# Patient Record
Sex: FEMALE | Race: WHITE | NOT HISPANIC OR LATINO | Employment: FULL TIME | URBAN - METROPOLITAN AREA
[De-identification: names, ages, dates, MRNs, and addresses within clinical notes are randomized per-mention and may not be internally consistent; named-entity substitution may affect disease eponyms.]

---

## 2024-02-12 ENCOUNTER — APPOINTMENT (OUTPATIENT)
Dept: PRIMARY CARE | Facility: CLINIC | Age: 49
End: 2024-02-12
Payer: COMMERCIAL

## 2024-02-14 ENCOUNTER — APPOINTMENT (OUTPATIENT)
Dept: PRIMARY CARE | Facility: CLINIC | Age: 49
End: 2024-02-14
Payer: COMMERCIAL

## 2024-02-20 ENCOUNTER — OFFICE VISIT (OUTPATIENT)
Dept: PRIMARY CARE | Facility: CLINIC | Age: 49
End: 2024-02-20
Payer: COMMERCIAL

## 2024-02-20 VITALS
SYSTOLIC BLOOD PRESSURE: 112 MMHG | HEART RATE: 91 BPM | BODY MASS INDEX: 24.11 KG/M2 | OXYGEN SATURATION: 99 % | DIASTOLIC BLOOD PRESSURE: 78 MMHG | TEMPERATURE: 97.4 F | WEIGHT: 150 LBS | HEIGHT: 66 IN

## 2024-02-20 DIAGNOSIS — Z12.31 VISIT FOR SCREENING MAMMOGRAM: ICD-10-CM

## 2024-02-20 DIAGNOSIS — Z00.00 ROUTINE GENERAL MEDICAL EXAMINATION AT A HEALTH CARE FACILITY: Primary | ICD-10-CM

## 2024-02-20 PROCEDURE — 1036F TOBACCO NON-USER: CPT | Performed by: INTERNAL MEDICINE

## 2024-02-20 PROCEDURE — 99396 PREV VISIT EST AGE 40-64: CPT | Performed by: INTERNAL MEDICINE

## 2024-02-20 RX ORDER — BISMUTH SUBSALICYLATE 262 MG
1 TABLET,CHEWABLE ORAL DAILY
COMMUNITY

## 2024-02-20 RX ORDER — NAPROXEN SODIUM 220 MG/1
81 TABLET, FILM COATED ORAL 2 TIMES DAILY
COMMUNITY

## 2024-02-20 ASSESSMENT — PATIENT HEALTH QUESTIONNAIRE - PHQ9
2. FEELING DOWN, DEPRESSED OR HOPELESS: NOT AT ALL
1. LITTLE INTEREST OR PLEASURE IN DOING THINGS: NOT AT ALL
SUM OF ALL RESPONSES TO PHQ9 QUESTIONS 1 AND 2: 0

## 2024-02-20 ASSESSMENT — ENCOUNTER SYMPTOMS
OCCASIONAL FEELINGS OF UNSTEADINESS: 0
LOSS OF SENSATION IN FEET: 0
DEPRESSION: 0

## 2024-02-20 NOTE — PROGRESS NOTES
Subjective   Patient ID: Angelique German is a 48 y.o. female who presents for Annual Exam (EP.  Annual exam.  Would like iron redrawn, she is having fatigue.).  HPI  Angelique German is here for annual check-up.      Concerns  1 yr     Reported Health good    Dental visits reg  Vision checks reg     Diet healthy iron rich foods   Exercise   less in winter  Caffeine  daily  Tobacco never  Alcohol  social    Female : Menstrual status /pregnancy status  reg and heavy     Colorectal cancer screening  2019    Current issues slight cold last week and lingering  cough   Had physical for work in the fall and had low iron had egd and colonoscopy in 2019    Was on pantoprazole and tries diet   Had shingles in Dec and was treated   by       Review of Systems  GENERAL - Denies fever,  or chills  is tired a lot   SKIN - Denies rash, new skin lesions, or change in moles  EYES - Denies blurred vision, or change in visual acuity  EARS - Denies ear pain, discharge, ringing, or difficulty hearing  NOSE - Denies nasal congestion, discharge, or bleeding  MOUTH - Denies sore throat, postnasal drip or painful/difficulty swallowing  NECK - Denies pain or swelling  RESPIRATORY - Denies shortness of breath, cough, wheezing  CARDIOVASCULAR - Denies palpitations, chest pain, orthopnea, peripheral edema, syncope or claudication  GASTROINTESTINAL - Denies nausea, vomiting, diarrhea, constipation, abdominal pain, melena and or bright red blood  GENITOURINARY - Denies dysuria, frequency of urination, urgency, or hesitancy  MUSCULOSKELETAL - Denies joint or muscle pain, or back pain  NEUROLOGICAL - Denies localized numbness, weakness, or tingling  PSYCHIATRIC - Denies depression, anxiety, substance abuse, suicidal or homicidal ideation  ENDOCRINE - Denies heat or cold intolerance, weight loss or gain, increasing thirst  HEMATO-IMMUNOLOGIC - Denies easy bruising, bleeding, oral ulcerations or recurrent infections      Objective   /78    "Pulse 91   Temp 36.3 °C (97.4 °F) (Oral)   Ht 1.676 m (5' 6\")   Wt 68 kg (150 lb)   LMP 02/06/2024   SpO2 99%   BMI 24.21 kg/m²      Physical Exam  CONSTITUTIONAL - well nourished, well developed, looks like stated age, in no acute distress, not ill-appearing, and not tired appearing  SKIN - normal skin color and pigmentation, normal skin turgor without rash, lesions, or nodules visualized  HEAD - no trauma, normocephalic  EYES  -  nl b  eomi and fundi normal  ENT - TM's intact, no injection, no exudate,  nasal passage without discharge and patent  NECK - supple without rigidity, no neck mass was observed, no thyromegaly or thyroid nodules  CHEST - clear to auscultation, no wheezing, no crackles and no rales, good effort  CARDIAC - regular rate and regular rhythm, no skipped beats, no murmur  ABDOMEN - no organomegaly, soft, nontender, nondistended, normal bowel sounds, no guarding/rebound/rigidity  EXTREMITIES - no edema, no deformities  NEUROLOGICAL -  nl no deficits  PSYCHIATRIC - alert, pleasant and cordial, age-appropriate  LYMPHATIC- no cervical lymphadenopathy    Assessment/Plan   Diagnoses and all orders for this visit:  Routine general medical examination at a health care facility  -     Iron and TIBC; Future  -     Ferritin; Future  -     CBC and Auto Differential; Future  -     Comprehensive Metabolic Panel; Future  -     Lipid Panel; Future  -     TSH with reflex to Free T4 if abnormal; Future  Visit for screening mammogram  -     BI mammo bilateral screening tomosynthesis; Future    Call w issues   Discussed diet and exzercise   Fu in 6 mos   Thao Ward MD   "

## 2024-02-22 LAB
NON-UH HIE A/G RATIO: 1.1
NON-UH HIE ALB: 3.7 G/DL (ref 3.4–5)
NON-UH HIE ALK PHOS: 71 UNIT/L (ref 45–117)
NON-UH HIE BASO COUNT: 0.08 X1000 (ref 0–0.2)
NON-UH HIE BASOS %: 0.7 %
NON-UH HIE BILIRUBIN, TOTAL: 0.5 MG/DL (ref 0.3–1.2)
NON-UH HIE BUN/CREAT RATIO: 17.5
NON-UH HIE BUN: 14 MG/DL (ref 9–23)
NON-UH HIE CALCIUM: 9.4 MG/DL (ref 8.7–10.4)
NON-UH HIE CALCULATED LDL CHOLESTEROL: 168 MG/DL (ref 60–130)
NON-UH HIE CALCULATED OSMOLALITY: 277 MOSM/KG (ref 275–295)
NON-UH HIE CHLORIDE: 106 MMOL/L (ref 98–107)
NON-UH HIE CHOLESTEROL: 255 MG/DL (ref 100–200)
NON-UH HIE CO2, VENOUS: 29 MMOL/L (ref 20–31)
NON-UH HIE CREATININE: 0.8 MG/DL (ref 0.5–0.8)
NON-UH HIE DIFF?: NO
NON-UH HIE EOS COUNT: 0.38 X1000 (ref 0–0.5)
NON-UH HIE EOSIN %: 3.5 %
NON-UH HIE FERRITIN: 27 NG/ML (ref 10–291)
NON-UH HIE GFR AA: >60
NON-UH HIE GLOBULIN: 3.4 G/DL
NON-UH HIE GLOMERULAR FILTRATION RATE: >60 ML/MIN/1.73M?
NON-UH HIE GLUCOSE: 80 MG/DL (ref 74–106)
NON-UH HIE GOT: 17 UNIT/L (ref 15–37)
NON-UH HIE GPT: 9 UNIT/L (ref 10–49)
NON-UH HIE HCT: 39.3 % (ref 36–46)
NON-UH HIE HDL CHOLESTEROL: 71 MG/DL (ref 40–60)
NON-UH HIE HGB: 13.3 G/DL (ref 12–16)
NON-UH HIE INSTR WBC: 10.9
NON-UH HIE IRON: 88 UG/DL (ref 50–170)
NON-UH HIE K: 4.2 MMOL/L (ref 3.5–5.1)
NON-UH HIE LYMPH %: 21.9 %
NON-UH HIE LYMPH COUNT: 2.39 X1000 (ref 1.2–4.8)
NON-UH HIE MCH: 30.7 PG (ref 27–34)
NON-UH HIE MCHC: 33.8 G/DL (ref 32–37)
NON-UH HIE MCV: 90.8 FL (ref 80–100)
NON-UH HIE MONO %: 6.4 %
NON-UH HIE MONO COUNT: 0.7 X1000 (ref 0.1–1)
NON-UH HIE MPV: 8.3 FL (ref 7.4–10.4)
NON-UH HIE NA: 139 MMOL/L (ref 135–145)
NON-UH HIE NEUTROPHIL %: 67.4 %
NON-UH HIE NEUTROPHIL COUNT (ANC): 7.35 X1000 (ref 1.4–8.8)
NON-UH HIE NUCLEATED RBC: 0 /100WBC
NON-UH HIE PLATELET: 408 X10 (ref 150–450)
NON-UH HIE RBC: 4.33 X10 (ref 4.2–5.4)
NON-UH HIE RDW: 13.2 % (ref 11.5–14.5)
NON-UH HIE SATURATION: 26 % (ref 20–50)
NON-UH HIE TIBC: 338 UG/ML (ref 250–425)
NON-UH HIE TOTAL CHOL/HDL CHOL RATIO: 3.6
NON-UH HIE TOTAL PROTEIN: 7.1 G/DL (ref 5.7–8.2)
NON-UH HIE TRIGLYCERIDES: 79 MG/DL (ref 30–150)
NON-UH HIE TSH: 1.68 UIU/ML (ref 0.55–4.78)
NON-UH HIE WBC: 10.9 X10 (ref 4.5–11)

## 2024-03-06 ENCOUNTER — APPOINTMENT (OUTPATIENT)
Dept: PRIMARY CARE | Facility: CLINIC | Age: 49
End: 2024-03-06
Payer: COMMERCIAL

## 2024-04-15 DIAGNOSIS — D68.59: ICD-10-CM

## 2024-04-15 DIAGNOSIS — D68.59: Primary | ICD-10-CM

## 2024-04-15 PROBLEM — R79.0 LOW IRON STORES: Status: ACTIVE | Noted: 2024-04-15

## 2024-04-15 PROBLEM — K21.9 GERD (GASTROESOPHAGEAL REFLUX DISEASE): Status: ACTIVE | Noted: 2024-04-15

## 2024-04-15 RX ORDER — ENOXAPARIN SODIUM 100 MG/ML
40 INJECTION SUBCUTANEOUS 2 TIMES DAILY
COMMUNITY
Start: 2017-10-11 | End: 2024-04-15 | Stop reason: SDUPTHER

## 2024-04-15 RX ORDER — ENOXAPARIN SODIUM 100 MG/ML
40 INJECTION SUBCUTANEOUS DAILY
Qty: 4 EACH | Refills: 2 | Status: SHIPPED | OUTPATIENT
Start: 2024-04-15 | End: 2024-04-15 | Stop reason: SDUPTHER

## 2024-04-15 RX ORDER — ENOXAPARIN SODIUM 100 MG/ML
40 INJECTION SUBCUTANEOUS DAILY
Qty: 4 EACH | Refills: 2 | Status: SHIPPED | OUTPATIENT
Start: 2024-04-15

## 2024-04-15 NOTE — TELEPHONE ENCOUNTER
The last RX was on  6/7/22 for-  Enoxaparin Sodium 40 mg/0.4 ml solution- Use 1 vial daily as directed   #4 x 0.4 ml with 2 RF

## 2024-04-15 NOTE — TELEPHONE ENCOUNTER
Called and cancelled Lovenox RX sent to [facility].  Re-entered RX to be sent to [facility] pharmacy.  LM on pt VM.

## 2024-04-15 NOTE — TELEPHONE ENCOUNTER
Pt ldm requesting refill for   Lovenox injection  [facility] outpatient pharmacy   Next appt: None  Last appt: 2/20/24